# Patient Record
(demographics unavailable — no encounter records)

---

## 2024-12-16 NOTE — PHYSICAL EXAM
[FreeTextEntry1] : General: Well-developed female in no apparent distress.  Patient is awake alert and with mixed aphasia.  Patient can variably follow simple commands. HEENT: Normocephalic, atraumatic.  MMM Extremities: No pedal edema.  Motor: Left upper extremity/left lower extremity: Tone normal, active range of motion within functional limits with 5/5 motor power throughout. Right upper extremity: MAS approximately 0-1 throughout.  Shoulder flexion <3/5 MP, elbow flexion 4/5 MP, wrist extension 4/5 MP, handgrip 3-4/5 MP.  Digit extension 4/5 MP.  Right lower extremity: Hip flexion 3-/5 MP, knee extension 4-4+/5 MP, ankle dorsiflexion 1-2/5 MP, ankle eversion/inversion < 2/5 MP, ankle plantarflexion 4-/5 MP.  Normal to decreased tone noted at the ankle.  Sensory: Intact to tactile stimulation to both lower extremities.  Functional status: Ambulated independently with a narrow-base quad cane with right custom molded semisolid AFO with good heel strike and with mild knee buckling noted during stance phase.

## 2024-12-16 NOTE — HISTORY OF PRESENT ILLNESS
[FreeTextEntry1] : Patient is a 72-year-old RHD PMHx HTN, DM, CAD/hypertrophic cardiomyopathy, CVA/right hemipareis/expressive aphasia who was last seen September 24, 2024 for a brace and gait evaluation.  Patient received her new AFO approximately 1 week ago but has not started using it yet.  Patient wants to have the feet checked before starting using the brace.  Patient been receiving outpatient PT 1x/week. Patient has weakness on the right upper and right lower extremity, no overt numbness though patient has an aphasia.  No falls reported, no near falls reported.  Patient has been ambulating independently with a narrow-base quad cane, independent transfers, independent dressing.  Patient lives in an elevated apartment alone but frequently has family staying over.  Patient's daughter lives in the same building.

## 2024-12-16 NOTE — ASSESSMENT
[FreeTextEntry1] : Patient is a 72-year-old RHD female PMHx HTN, DM, CAD/hypertrophic cardiomyopathy s/p CVA (1/2024) with right hemiparesis, mixed aphasia, and gait impairments noted above.  Patient received her new right semisolid AFO approximately 1 week ago.  Brace fits well though possibly tight towards the medial footplate.  Discussed with patient's son that if she develops redness or irritation in that area to return to the  for adjustment.  Reviewed proper donning technique with the patient's  and son.  Prescription provided for outpatient physical therapy and to increase therapy to 2 times per week to emphasize gait training/gait mechanics with her new AFO, see Rx.  I spent a total of 20 minutes on the date of the encounter evaluating and treating the patient in a discussion of treatment options.